# Patient Record
Sex: FEMALE | Race: BLACK OR AFRICAN AMERICAN | NOT HISPANIC OR LATINO | URBAN - METROPOLITAN AREA
[De-identification: names, ages, dates, MRNs, and addresses within clinical notes are randomized per-mention and may not be internally consistent; named-entity substitution may affect disease eponyms.]

---

## 2021-02-09 ENCOUNTER — THERAPY VISIT (OUTPATIENT)
Dept: PHYSICAL THERAPY | Facility: CLINIC | Age: 31
End: 2021-02-09
Payer: COMMERCIAL

## 2021-02-09 DIAGNOSIS — M54.16 LEFT LUMBAR RADICULOPATHY: ICD-10-CM

## 2021-02-09 PROCEDURE — 97110 THERAPEUTIC EXERCISES: CPT | Mod: GP | Performed by: PHYSICAL THERAPIST

## 2021-02-09 PROCEDURE — 97014 ELECTRIC STIMULATION THERAPY: CPT | Mod: GP | Performed by: PHYSICAL THERAPIST

## 2021-02-09 PROCEDURE — 97161 PT EVAL LOW COMPLEX 20 MIN: CPT | Mod: GP | Performed by: PHYSICAL THERAPIST

## 2021-02-09 NOTE — PROGRESS NOTES
[facility] Initial Evaluation  Subjective:  The history is provided by the patient. No  was used.   Patient Health History  Ngozi Chaidez being seen for LBP down Left leg to toes.     Problem began: 10/1/2020.   Problem occurred: Patient slipped down her steps and landed on her butt, but her back hit the edge of the step first.  Current pain didn't present until 2-3 wks later.   Pain is reported as 8/10 on pain scale.  General health as reported by patient is fair.  Pertinent medical history includes: overweight and high blood pressure.   Red flags:  Pain at rest/night.         Current medications:  High blood pressure medication.    Occupation: Stay at home mom.   Primary job tasks include:  Lifting/carrying.                  Therapist Generated HPI Evaluation         Type of problem:  Lumbar.    This is a new condition.  Condition occurred with:  A fall/slip.  Where condition occurred: at home.  Patient reports pain:  Lower lumbar spine.  Pain is described as cramping and aching and is constant.  Pain radiates to:  Gluteals left, knee left, lower leg left and foot left. Pain is the same all the time.  Since onset symptoms are gradually worsening.  Associated symptoms:  Tingling and numbness. Symptoms are exacerbated by standing (lying on L side, placing weight on L leg, prolonged sitting, prolonged standing, walking)  and relieved by nothing.  Special tests included:  MRI (patient said a pinched nerve in her back).    Restrictions due to condition include:  Working in normal job without restrictions.  Barriers include:  None as reported by patient.                        Objective:  Standing Alignment:        Lumbar:  Anterior pelvic tilt and sway back            Gait:    Gait Type:  Antalgic   Assistive Devices:  None  Deviations:  Knee:  Knee extension decr LAnkle:  Push off decr L and heel strike decr LGeneral Deviations:  Julia decr, stance time decr and stride  length decr               Lumbar/SI Evaluation  ROM:    AROM Lumbar:   Flexion:          Top of knee++  Ext:                    33%++   Side Bend:        Left:     Right:   Rotation:           Left:     Right:   Side Glide:        Left:  WNL    Right:  WNL          Lumbar Myotomes:  Lumbar myotomes: strength limited by pain.  Unable to walk on toes or heels, but due to unable to place full weight on L LE.            Lumbar DTR's:    L4 (Quad):  Left:  2   Right:  2  S1 (Achilles):  Left:  1   Right:  2      Neural Tension/Mobility:    Left side:  Slump positive.     Right side:   Slump  negative.   Lumbar Palpation:  normal                                                         General     ROS    Assessment/Plan:    Patient is a 30 year old female with lumbar complaints.    Patient has the following significant findings with corresponding treatment plan.                Diagnosis 1:  L lumbar radiculopathy with likely L5-S1 disc bulge  Pain -  hot/cold therapy, electric stimulation, manual therapy, self management, education, directional preference exercise and home program  Decreased ROM/flexibility - manual therapy, therapeutic exercise and home program  Decreased strength - therapeutic exercise, therapeutic activities and home program  Impaired gait - gait training and home program  Impaired muscle performance - neuro re-education and home program  Decreased function - therapeutic activities and home program  Impaired posture - neuro re-education and home program    Therapy Evaluation Codes:   1) History comprised of:   Personal factors that impact the plan of care:      None.    Comorbidity factors that impact the plan of care are:      None.     Medications impacting care: None.  2) Examination of Body Systems comprised of:   Body structures and functions that impact the plan of care:      Lumbar spine.   Activity limitations that impact the plan of care are:      Bending, Cooking, Dressing, Lifting, Sitting,  Standing, Walking and Sleeping.  3) Clinical presentation characteristics are:   Evolving/Changing.  4) Decision-Making    Low complexity using standardized patient assessment instrument and/or measureable assessment of functional outcome.  Cumulative Therapy Evaluation is: Low complexity.    Previous and current functional limitations:  (See Goal Flow Sheet for this information)    Short term and Long term goals: (See Goal Flow Sheet for this information)     Communication ability:  Patient appears to be able to clearly communicate and understand verbal and written communication and follow directions correctly.  Treatment Explanation - The following has been discussed with the patient:   RX ordered/plan of care  Anticipated outcomes  Possible risks and side effects  This patient would benefit from PT intervention to resume normal activities.   Rehab potential is good.    Frequency:  2 X week, once daily  Duration:  for 2 weeks tapering to 1 X a week over 2 weeks  Discharge Plan:  Achieve all LTG.  Independent in home treatment program.  Reach maximal therapeutic benefit.    Please refer to the daily flowsheet for treatment today, total treatment time and time spent performing 1:1 timed codes.

## 2021-02-11 ENCOUNTER — THERAPY VISIT (OUTPATIENT)
Dept: PHYSICAL THERAPY | Facility: CLINIC | Age: 31
End: 2021-02-11
Payer: COMMERCIAL

## 2021-02-11 DIAGNOSIS — M54.16 LEFT LUMBAR RADICULOPATHY: ICD-10-CM

## 2021-02-11 PROCEDURE — 97014 ELECTRIC STIMULATION THERAPY: CPT | Mod: GP | Performed by: PHYSICAL THERAPIST

## 2021-02-11 PROCEDURE — 97112 NEUROMUSCULAR REEDUCATION: CPT | Mod: GP | Performed by: PHYSICAL THERAPIST

## 2021-02-11 PROCEDURE — 97110 THERAPEUTIC EXERCISES: CPT | Mod: GP | Performed by: PHYSICAL THERAPIST

## 2021-02-18 ENCOUNTER — THERAPY VISIT (OUTPATIENT)
Dept: PHYSICAL THERAPY | Facility: CLINIC | Age: 31
End: 2021-02-18
Payer: COMMERCIAL

## 2021-02-18 DIAGNOSIS — M54.16 LEFT LUMBAR RADICULOPATHY: ICD-10-CM

## 2021-02-18 PROCEDURE — 97014 ELECTRIC STIMULATION THERAPY: CPT | Mod: GP | Performed by: PHYSICAL THERAPIST

## 2021-02-18 PROCEDURE — 97110 THERAPEUTIC EXERCISES: CPT | Mod: GP | Performed by: PHYSICAL THERAPIST

## 2021-02-18 PROCEDURE — 97140 MANUAL THERAPY 1/> REGIONS: CPT | Mod: GP | Performed by: PHYSICAL THERAPIST

## 2021-02-23 ENCOUNTER — THERAPY VISIT (OUTPATIENT)
Dept: PHYSICAL THERAPY | Facility: CLINIC | Age: 31
End: 2021-02-23
Payer: COMMERCIAL

## 2021-02-23 DIAGNOSIS — M54.16 LEFT LUMBAR RADICULOPATHY: ICD-10-CM

## 2021-02-23 PROCEDURE — 97110 THERAPEUTIC EXERCISES: CPT | Mod: GP | Performed by: PHYSICAL THERAPIST

## 2021-02-23 PROCEDURE — 97140 MANUAL THERAPY 1/> REGIONS: CPT | Mod: GP | Performed by: PHYSICAL THERAPIST

## 2021-03-04 ENCOUNTER — THERAPY VISIT (OUTPATIENT)
Dept: PHYSICAL THERAPY | Facility: CLINIC | Age: 31
End: 2021-03-04
Payer: COMMERCIAL

## 2021-03-04 DIAGNOSIS — M54.16 LEFT LUMBAR RADICULOPATHY: ICD-10-CM

## 2021-03-04 PROCEDURE — 97140 MANUAL THERAPY 1/> REGIONS: CPT | Mod: GP | Performed by: PHYSICAL THERAPIST

## 2021-03-04 PROCEDURE — 97110 THERAPEUTIC EXERCISES: CPT | Mod: GP | Performed by: PHYSICAL THERAPIST

## 2021-03-04 PROCEDURE — 97014 ELECTRIC STIMULATION THERAPY: CPT | Mod: GP | Performed by: PHYSICAL THERAPIST

## 2021-03-04 NOTE — PROGRESS NOTES
DISCHARGE  REPORT    Progress reporting period is from 2/9/21 to 3/4/21.       SUBJECTIVE  Subjective changes noted by patient: Patient is able to put weight onto L leg, straighten L knee and walk normal now, but still rates pain 6-8/10.  Says she gets muscle spasms lying in bed at night.  Reports she is doing the HEP and they feel good, but haven't seemed to decrease pain after a month.     Current Pain level: 7/10.      Initial Pain level: 8/10.   Changes in function:  Yes (See Goal flowsheet attached for changes in current functional level)  Adverse reaction to treatment or activity: None    OBJECTIVE  Changes noted in objective findings:  Yes,      Normal gait now and able to extend L knee and put full weight on that leg.    Pain worse with extension and slightly better with flexion.    Very week abdominals and has abdominal wall seperation of greater than 2 finger widths.     Lumbar ROM improved and no longer increases pain in all directions: Flex to mid shin(tight), ext 50% limit, B SG WNL.  No motions increased pain today.     B L4 reflex 2/4 and L achilles reflex 1/4 compared to 2/4 on the R.    Positive Slump on the L.     ASSESSMENT/PLAN  Updated problem list and treatment plan: Diagnosis 1:  LBP with L radiculopathy  Pain -  hot/cold therapy, electric stimulation, manual therapy, self management, education and home program  Decreased strength - therapeutic exercise, therapeutic activities and home program  Impaired muscle performance - neuro re-education and home program  Decreased function - therapeutic activities and home program  STG/LTGs have been met or progress has been made towards goals:  Yes (See Goal flow sheet completed today.)  Assessment of Progress: This patients condition has improved, but not as quickly as expected with her pain relief.  Self Management Plans:  Patient has been instructed in a home treatment program.  Patient  has been instructed in self management of symptoms.  I have  re-evaluated this patient and find that the nature, scope, duration and intensity of the therapy is appropriate for the medical condition of the patient.  Ngozi continues to require the following intervention to meet STG and LTG's:  PT as she needs continued strengthening, but return to MD for further evaluation first.    Recommendations:  This patient would benefit from further evaluation and will see MD before returning to PT    This patient would benefit from continued therapy.     Frequency:  2 X a month, once daily  Duration:  for 2 months    However, patient has not returned to therapy in 6 wks , so plan to discharge with independent HEP        Please refer to the daily flowsheet for treatment today, total treatment time and time spent performing 1:1 timed codes.

## 2021-03-04 NOTE — LETTER
[facility]THLETIC [facility]  [address]  [place] [address] [phone]    2021    Re: Ngozi Chaidez   :   1990  MRN:  [id number]   REFERRING PHYSICIAN:   Johnson Hatch    [facility]THLET[facility] PHYSICAL [facility]    Date of Initial Evaluation:  2021  Visits:  Rxs Used: 5  Reason for Referral:  Left lumbar radiculopathy    EVALUATION SUMMARY    PROGRESS  REPORT    Progress reporting period is from 21 to 3/4/21.       SUBJECTIVE  Subjective changes noted by patient: Patient is able to put weight onto L leg, straighten L knee and walk normal now, but still rates pain 6-8/10.  Says she gets muscle spasms lying in bed at night.  Reports she is doing the HEP and they feel good, but haven't seemed to decrease pain after a month.     Current Pain level: 7/10.      Initial Pain level: 8/10.   Changes in function:  Yes (See Goal flowsheet attached for changes in current functional level)  Adverse reaction to treatment or activity: None    OBJECTIVE  Changes noted in objective findings:  Yes,      Normal gait now and able to extend L knee and put full weight on that leg.    Pain worse with extension and slightly better with flexion.    Very week abdominals and has abdominal wall seperation of greater than 2 finger widths.     Lumbar ROM improved and no longer increases pain in all directions: Flex to mid shin(tight), ext 50% limit, B SG WNL.  No motions increased pain today.     B L4 reflex 2/4 and L achilles reflex 1/4 compared to 2/4 on the R.    Positive Slump on the L.     ASSESSMENT/PLAN  Updated problem list and treatment plan: Diagnosis 1:  LBP with L radiculopathy  Pain -  hot/cold therapy, electric stimulation, manual therapy, self management, education and home program  Decreased strength - therapeutic exercise, therapeutic activities and home program  Impaired muscle performance - neuro re-education and home  program  Decreased function - therapeutic activities and home program  STG/LTGs have been met or progress has been made towards goals:  Yes (See Goal flow sheet completed today.)  Assessment of Progress: This patients condition has improved, but not as quickly as expected with her pain relief.  Self Management Plans:  Patient has been instructed in a home treatment program.  Patient  has been instructed in self management of symptoms.  I have re-evaluated this patient and find that the nature, scope, duration and intensity of the therapy is appropriate for the medical condition of the patient.  Ngozi continues to require the following intervention to meet STG and LTG's:  PT as she needs continued strengthening, but return to MD for further evaluation first.    Recommendations:  This patient would benefit from further evaluation and will see MD before returning to PT    This patient would benefit from continued therapy.     Frequency:  2 X a month, once daily  Duration:  for 2 months        Thank you for your referral.    INQUIRIES  Therapist: Mckenna Martinez [facility]   [facility] - [facility] PHYSICAL THERAPY  [address] [place], [place] [address]  [facility] [address]  Phone: [phone]  Fax: [phone]

## 2021-04-15 PROBLEM — M54.16 LEFT LUMBAR RADICULOPATHY: Status: RESOLVED | Noted: 2021-02-09 | Resolved: 2021-04-15

## 2022-02-14 ENCOUNTER — APPOINTMENT (OUTPATIENT)
Dept: GENERAL RADIOLOGY | Facility: CLINIC | Age: 32
End: 2022-02-14
Attending: EMERGENCY MEDICINE
Payer: COMMERCIAL

## 2022-02-14 ENCOUNTER — HOSPITAL ENCOUNTER (EMERGENCY)
Facility: CLINIC | Age: 32
Discharge: HOME | End: 2022-02-14
Attending: EMERGENCY MEDICINE | Admitting: EMERGENCY MEDICINE
Payer: COMMERCIAL

## 2022-02-14 VITALS
HEART RATE: 74 BPM | OXYGEN SATURATION: 100 % | TEMPERATURE: 98 F | RESPIRATION RATE: 16 BRPM | DIASTOLIC BLOOD PRESSURE: 87 MMHG | SYSTOLIC BLOOD PRESSURE: 138 MMHG

## 2022-02-14 DIAGNOSIS — M25.461 EFFUSION OF RIGHT KNEE: ICD-10-CM

## 2022-02-14 DIAGNOSIS — M25.512 ACUTE PAIN OF LEFT SHOULDER: ICD-10-CM
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
## 2022-02-14 PROCEDURE — 73030 X-RAY EXAM OF SHOULDER: CPT | Mod: LT

## 2022-02-14 PROCEDURE — 73562 X-RAY EXAM OF KNEE 3: CPT | Mod: RT

## 2022-02-14 PROCEDURE — 99284 EMERGENCY DEPT VISIT MOD MDM: CPT | Performed by: EMERGENCY MEDICINE

## 2022-02-14 RX ORDER — CYCLOBENZAPRINE HCL 10 MG
10 TABLET ORAL 3 TIMES DAILY PRN
Qty: 20 TABLET | Refills: 0 | Status: SHIPPED | OUTPATIENT
Start: 2022-02-14 | End: 2022-02-20

## 2022-02-14 RX ORDER — IBUPROFEN 600 MG/1
600 TABLET, FILM COATED ORAL EVERY 6 HOURS PRN
Qty: 30 TABLET | Refills: 0 | Status: SHIPPED | OUTPATIENT
Start: 2022-02-14

## 2022-02-15 NOTE — ED PROVIDER NOTES
History     Chief Complaint   Patient presents with     Shoulder Injury     L shoulder after fall     Knee Pain     R knee after fall     HPI  Ngozi Chaidez is a 31 year old female who presents to the emergency department with a chief complaint of fall.  The patient reports that she looked and fell on the ice yesterday.  She now complains of a swollen R knee and painful L shoulder.  Pain worsens with ROM. She has been able to ambulate. No other associated injuries.  The patient is not on any blood thinners.  She tried ibuprofen and Tylenol at home with incomplete relief.    I have reviewed the Medications, Allergies, Past Medical and Surgical History, and Social History in the Epic system.    No past medical history on file.  No past surgical history on file.  No current facility-administered medications for this encounter.     No current outpatient medications on file.     Not on File  Past medical history, past surgical history, medications, and allergies were reviewed with the patient. Additional pertinent items: None    Social History     Socioeconomic History     Marital status: Single     Spouse name: Not on file     Number of children: Not on file     Years of education: Not on file     Highest education level: Not on file   Occupational History     Not on file   Tobacco Use     Smoking status: Not on file     Smokeless tobacco: Not on file   Substance and Sexual Activity     Alcohol use: Not on file     Drug use: Not on file     Sexual activity: Not on file   Other Topics Concern     Not on file   Social History Narrative     Not on file     Social Determinants of Health     Financial Resource Strain: Not on file   Food Insecurity: Not on file   Transportation Needs: Not on file   Physical Activity: Not on file   Stress: Not on file   Social Connections: Not on file   Intimate Partner Violence: Not on file   Housing Stability: Not on file     Social history was reviewed with the patient. Additional  pertinent items: None    Review of Systems  General: No fevers or chills  Skin: No rash or diaphoresis  Eyes: No eye redness or discharge  Ears/Nose/Throat: No rhinorrhea or nasal congestion  Respiratory: No cough or SOB  Cardiovascular: No chest pain or palpitations  Gastrointestinal: No nausea, vomiting, or diarrhea  Genitourinary: No urinary frequency, hematuria, or dysuria  Musculoskeletal: See HPI  Neurologic: No numbness or weakness  Hematologic/Lymphatic/Immunologic: No leg swelling, no easy bruising/bleeding  Endocrine: No polyuria/polydypsia    A complete review of systems was performed with pertinent positives and negatives noted in the HPI, and all other systems negative.    Physical Exam   BP: 123/75  Pulse: 118  Temp: 99  F (37.2  C)  Resp: 16  SpO2: 96 %      General: Well nourished, well developed, NAD  HEENT: EOMI, anicteric. NCAT, MMM  Neck: no jugular venous distension, supple, nl ROM  Cardiac: Tachycardic rate, intact peripheral pulses  Pulm: Airway patent, nonlabored breathing  Skin: Warm and dry to the touch.  No rash  Extremities: No LE edema, no cyanosis, w/w/p, swelling to right knee, pain with range of motion, but range of motion otherwise intact and knee joint is stable on my exam, distally neurovascularly intact, tenderness to palpation over posterior left shoulder, pain with range of motion, but range of motion otherwise intact.  Rotator cuff is intact, extremity is distally neurovascularly intact  Neuro: A&Ox3, no gross focal deficits    ED Course        Procedures                          Labs Ordered and Resulted from Time of ED Arrival to Time of ED Departure - No data to display         Results for orders placed or performed during the hospital encounter of 02/14/22 (from the past 24 hour(s))   XR Knee Right 3 Views    Narrative    EXAM: XR KNEE RIGHT 3 VIEWS  LOCATION: [facility]  DATE/TIME: 2/14/2022 6:08 PM    INDICATION: fall, pain,  swelling  COMPARISON: None.      Impression    IMPRESSION: Probable right knee joint effusion. Joint spaces are maintained. No fracture.   XR Shoulder Left G/E 3 Views    Narrative    EXAM: XR SHOULDER LEFT G/E 3 VIEWS  LOCATION: [facility]  DATE/TIME: 2/14/2022 6:08 PM    INDICATION: falll, pain swelling  COMPARISON: None.      Impression    IMPRESSION: Normal joint spaces and alignment. No fracture or dislocation.        Labs, vital signs, and imaging studies were reviewed by me.    Medications - No data to display    Assessments & Plan (with Medical Decision Making)   Ngozi Chaidez is a 31 year old female who presents to the emergency department after slip and fall on the ice complaining of left shoulder and right knee pain.  X-rays ordered to further evaluate the patient for underlying fracture or dislocation.  Differential diagnosis also includes contusion, sprain, joint effusion.    X-ray of the right knee reveals probable right knee joint effusion, no acute fracture.    X-ray of the left shoulder shows no fracture or dislocation.    Patient provided with an Ace wrap for her right knee.    I have reviewed the nursing notes.    I have reviewed the findings, diagnosis, plan and need for follow up with the patient.    Patient to be discharged home. Advised to follow up with PCP within 1 week.  She is advised to discuss possibility of outpatient MRI, particularly of the right knee if symptoms do not resolve after several weeks.  To return to ER immediately with any new/worsening symptoms. Plan of care discussed with patient who expresses understanding and agrees with plan of care.  Prescriptions for medications for symptomatic relief at home were provided to the patient.    New Prescriptions    CYCLOBENZAPRINE (FLEXERIL) 10 MG TABLET    Take 1 tablet (10 mg) by mouth 3 times daily as needed    IBUPROFEN (ADVIL/MOTRIN) 600 MG TABLET    Take 1 tablet (600 mg) by  mouth every 6 hours as needed       Final diagnoses:   Acute pain of left shoulder   Effusion of right knee   Fall due to slipping on ice or snow, initial encounter     Rae Gonzalez MD  2/14/2022   [facility] EMERGENCY DEPARTMENT     Rae Gonzalez MD  02/14/22 1954

## 2022-02-15 NOTE — DISCHARGE INSTRUCTIONS
TODAY'S VISIT:  You were seen today for fall  -   - If you had any labs or imaging/radiology tests performed today, you should also discuss these tests with your usual provider.     FOLLOW-UP:  Please make an appointment to follow up with:  - Your Primary Care Provider. If you do not have a PCP, please call the Primary Care Center (phone: [phone] for an appointment    - Have your provider review the results from today's visit with you again to make sure no further follow-up or additional testing is needed based on those results.     PRESCRIPTIONS / MEDICATIONS:  - You may take Ibuprofen and/or Tylenol as needed for pain. You can take 600 mg of Ibuprofen every 6 hours and 1 g Tylenol every 6 hours. It may help to alternate these, so you take something every 3 hours.     OTHER INSTRUCTIONS:  -  Applying ice to the affected area several times a day over the next 1-2 days may also help with pain and swelling    RETURN TO THE EMERGENCY DEPARTMENT  Return to the Emergency Department at any time for any new or worsening symptoms or any concerns.

## 2022-02-15 NOTE — ED PROVIDER NOTES
[facility] EMERGENCY DEPARTMENT ([facility])    2/14/22      History     Chief Complaint   Patient presents with     Shoulder Injury     L shoulder after fall     Knee Pain     R knee after fall     HPI  Ngozi Chaidez is a 31 year old otherwise healthy female who presents to the ED for an evaluation of right knee and left shoulder pain. Patient reports falling on the ice yesterday.***    Past Medical History  No past medical history on file.  No past surgical history on file.  No current outpatient medications on file.    Not on File  Family History  No family history on file.  Social History   Social History     Tobacco Use     Smoking status: Not on file     Smokeless tobacco: Not on file   Substance Use Topics     Alcohol use: Not on file     Drug use: Not on file      Past medical history, past surgical history, medications, allergies, family history, and social history were reviewed with the patient. No additional pertinent items.       Review of Systems  A complete review of systems was performed with pertinent positives and negatives noted in the HPI, and all other systems negative.    Physical Exam   BP: 123/75  Pulse: 118  Temp: 99  F (37.2  C)  Resp: 16  SpO2: 96 %  Physical Exam  ***  ED Course      Procedures       {ED Course Selections:684059}              No results found for any visits on 02/14/22.  Medications - No data to display     Assessments & Plan (with Medical Decision Making)   ***    I have reviewed the nursing notes. I have reviewed the findings, diagnosis, plan and need for follow up with the patient.    New Prescriptions    No medications on file       Final diagnoses:   None       --  ***  [facility] EMERGENCY DEPARTMENT  2/14/2022

## 2022-09-26 ENCOUNTER — TRANSCRIBE ORDERS (OUTPATIENT)
Dept: OTHER | Age: 32
End: 2022-09-26

## 2022-09-26 DIAGNOSIS — M54.32 SCIATICA OF LEFT SIDE: Primary | ICD-10-CM

## 2022-10-27 ENCOUNTER — THERAPY VISIT (OUTPATIENT)
Dept: PHYSICAL THERAPY | Facility: CLINIC | Age: 32
End: 2022-10-27
Payer: COMMERCIAL

## 2022-10-27 DIAGNOSIS — G89.29 CHRONIC BILATERAL LOW BACK PAIN WITH LEFT-SIDED SCIATICA: ICD-10-CM

## 2022-10-27 DIAGNOSIS — M54.42 CHRONIC BILATERAL LOW BACK PAIN WITH LEFT-SIDED SCIATICA: ICD-10-CM

## 2022-10-27 DIAGNOSIS — M54.32 SCIATICA OF LEFT SIDE: ICD-10-CM

## 2022-10-27 PROCEDURE — 97110 THERAPEUTIC EXERCISES: CPT | Mod: GP | Performed by: PHYSICAL THERAPIST

## 2022-10-27 PROCEDURE — 97161 PT EVAL LOW COMPLEX 20 MIN: CPT | Mod: GP | Performed by: PHYSICAL THERAPIST

## 2022-10-27 ASSESSMENT — ACTIVITIES OF DAILY LIVING (ADL)
WALKING_15_MINUTES_OR_GREATER: MODERATE DIFFICULTY
ROLLING_OVER_IN_BED: SLIGHT DIFFICULTY
WALKING_APPROXIMATELY_10_MINUTES: NO DIFFICULTY AT ALL
GETTING_INTO_AND_OUT_OF_A_BATHTUB: NO DIFFICULTY AT ALL
LIGHT_TO_MODERATE_WORK: SLIGHT DIFFICULTY
SITTING_FOR_15_MINUTES: SLIGHT DIFFICULTY
HOS_ADL_SCORE(%): 78.33
GOING_UP_1_FLIGHT_OF_STAIRS: EXTREME DIFFICULTY
HOS_ADL_COUNT: 15
GOING_DOWN_1_FLIGHT_OF_STAIRS: NO DIFFICULTY AT ALL
WALKING_UP_STEEP_HILLS: MODERATE DIFFICULTY
STANDING_FOR_15_MINUTES: MODERATE DIFFICULTY
WALKING_INITIALLY: NO DIFFICULTY AT ALL
HOS_ADL_ITEM_SCORE_TOTAL: 47
STEPPING_UP_AND_DOWN_CURBS: NO DIFFICULTY AT ALL
WALKING_DOWN_STEEP_HILLS: NO DIFFICULTY AT ALL
PUTTING_ON_SOCKS_AND_SHOES: NO DIFFICULTY AT ALL
GETTING_INTO_AND_OUT_OF_AN_AVERAGE_CAR: NO DIFFICULTY AT ALL
HEAVY_WORK: MODERATE DIFFICULTY
TWISTING/PIVOTING_ON_INVOLVED_LEG: NO DIFFICULTY AT ALL
HOS_ADL_HIGHEST_POTENTIAL_SCORE: 60

## 2022-10-27 NOTE — PROGRESS NOTES
[facility] Initial Evaluation     Present: The history is provided by the patient. No  was used.    Subjective:  Ngozi Chaidez is a 31 year old female with complaints of lumbar radiculopathy - reports pain across her low back, down her left leg, and numbness in lateral 3 toes on the left. Pt reports that it started about a 1.5 years ago with no known mechanism of injury.     Symptoms commenced as a result of: insidious onset. Location of symptoms: across low back, down leg into foot. Pain level on number scale: 6/10. Quality of pain: achy, shooting, changing sensation. Associated symptoms: radiating pain into foot. Pain frequency (constant/intermittent): constant. Symptoms are exacerbated by: walking - pain in left hip. Symptoms are relieved by: unknown. Progression of symptoms since onset: worse. Imaging: see epic. Previous treatment/response: PT previously, was not helpful.      General health as reported by patient is fair. Pertinent medical history includes: See Epic. Medical allergies: see Epic. Other pertinent surgeries: see Epic. Current medications: See Epic.     Occupation: none.     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    Red flags:  None reported by patient.    Objective  Observation:  Posture: rounded/protracted shoulder    Gait: WFL      Lumbar Movement Response   Flexion 80%, reaches to ankles, pull in low back and legs   Extension 70%   Side bending/glide L 60%, hinges in mid/upper lumbar   Side bending/glide R 60%, hinges in mid/upper lumbar   Rotation L 80%   Rotation R 80%   Quadrants (if applicable)      Neurological:    Myotomes L R   L1-2 (hip flexion) 5/5 5/5   L3 (knee extension) 5/5 5/5   L4 (ankle DF) 5/5 5/5   L5 (g. toe ext) 5/5 5/5   S1 (ankle PF or knee flex) 5/5 5/5     Dural Signs L R   Slump + -   SLR + -     *improved slump test with  cervical/thoracic extension    Palpation: tenderness with palpation of lower lumbar paraspinals    Prone Assessment: hypomobility through thoracic spine, improved with manual tx    Key Findings  Patient presents with bilateral low back pain and radicular symptoms into left leg/lateraal foot/toes in S1 distribution; hypomobility in lower lumbar, decreased lumbar AROM; positive neurodynamic testing, worse on left side      Assessment/Plan:    Patient is a 31 year old female with lumbar complaints.    Patient has the following significant findings with corresponding treatment plan.                Diagnosis 1:  Lumbar radiculopathy  Pain -  self management, education and home program  Decreased ROM/flexibility - manual therapy, therapeutic exercise, therapeutic activity and home program    Therapy Evaluation Codes:     Cumulative Therapy Evaluation is: Low complexity.    Previous and current functional limitations:  (See Goal Flow Sheet for this information)    Short term and Long term goals: (See Goal Flow Sheet for this information)     Communication ability:  Patient appears to be able to clearly communicate and understand verbal and written communication and follow directions correctly.  Treatment Explanation - The following has been discussed with the patient:   RX ordered/plan of care  Anticipated outcomes  Possible risks and side effects  This patient would benefit from PT intervention to resume normal activities.   Rehab potential is good.    Frequency:  1 X week, once daily  Duration:  for 8 weeks  Discharge Plan:  Achieve all LTG.  Independent in home treatment program.  Reach maximal therapeutic benefit.    Please refer to the daily flowsheet for treatment today, total treatment time and time spent performing 1:1 timed codes.     Please Contact me with any questions or concerns. Thank you for your time and entrusting me with your care.     Anthony Schwartz, PT, DPT, OCS, CSMT  Physical Therapist  [facility]  [facility] - town  [phone]

## 2022-10-27 NOTE — PROGRESS NOTES
[facility]    OUTPATIENT Physical Therapy ORTHOPEDIC EVALUATION  PLAN OF TREATMENT FOR OUTPATIENT REHABILITATION  (COMPLETE FOR INITIAL CLAIMS ONLY)  Patient's Last Name, First Name, M.I.  YOB: 1990  Ngozi Chaidez    Provider s Name:  [facility]   Medical Record No.  [id number]   Start of Care Date:  10/27/22   Onset Date:   09/26/22 (PT referral date)   Treatment Diagnosis:  L sciatica Medical Diagnosis:     Sciatica of left side  Chronic bilateral low back pain with left-sided sciatica       Goals:     10/27/22 0500   Body Part   Goals listed below are for L sciatica   Goal #1   Goal #1 standing   Previous Functional Level No restrictions   Current Functional Level Minutes patient can stand   Performance level 15 minutes, 6/10 pain   STG Target Performance Minutes patient will be able to stand   Performance level 15 minutes, <3/10 pain   Rationale for housekeeping tasks such as vacuuming, bed making, mowing, gardening;for meal preparation   Due date 11/17/22   LTG Target Performance Minutes patient will be able to stand   Performance Level 30 min, no pain   Rationale for housekeeping tasks such as vacuuming, bed making, mowing;for personal hygiene;for meal preparation   Due date 12/22/22       Therapy Frequency:  1x/week  Predicted Duration of Therapy Intervention:  12 weeks    Anthony Schwartz, PT                 I CERTIFY THE NEED FOR THESE SERVICES FURNISHED UNDER        THIS PLAN OF TREATMENT AND WHILE UNDER MY CARE     (Physician attestation of this document indicates review and certification of the therapy plan).                     Certification Date From:  10/27/22   Certification Date To:  01/24/23    Referring Provider:  Justo Armstrong    Initial Assessment        See Epic Evaluation SOC Date: 10/27/22

## 2022-11-11 ENCOUNTER — THERAPY VISIT (OUTPATIENT)
Dept: PHYSICAL THERAPY | Facility: CLINIC | Age: 32
End: 2022-11-11
Payer: COMMERCIAL

## 2022-11-11 DIAGNOSIS — M54.16 LUMBAR RADICULOPATHY: ICD-10-CM

## 2022-11-11 DIAGNOSIS — G89.29 CHRONIC BILATERAL LOW BACK PAIN WITH LEFT-SIDED SCIATICA: Primary | ICD-10-CM

## 2022-11-11 DIAGNOSIS — M54.42 CHRONIC BILATERAL LOW BACK PAIN WITH LEFT-SIDED SCIATICA: Primary | ICD-10-CM

## 2022-11-11 PROCEDURE — 97012 MECHANICAL TRACTION THERAPY: CPT | Mod: GP | Performed by: PHYSICAL THERAPIST

## 2022-11-11 PROCEDURE — 97110 THERAPEUTIC EXERCISES: CPT | Mod: GP | Performed by: PHYSICAL THERAPIST

## 2022-11-23 ENCOUNTER — THERAPY VISIT (OUTPATIENT)
Dept: PHYSICAL THERAPY | Facility: CLINIC | Age: 32
End: 2022-11-23
Payer: COMMERCIAL

## 2022-11-23 DIAGNOSIS — M54.42 CHRONIC BILATERAL LOW BACK PAIN WITH LEFT-SIDED SCIATICA: Primary | ICD-10-CM

## 2022-11-23 DIAGNOSIS — M54.16 LUMBAR RADICULOPATHY: ICD-10-CM

## 2022-11-23 DIAGNOSIS — G89.29 CHRONIC BILATERAL LOW BACK PAIN WITH LEFT-SIDED SCIATICA: Primary | ICD-10-CM

## 2022-11-23 PROCEDURE — 97110 THERAPEUTIC EXERCISES: CPT | Mod: GP | Performed by: PHYSICAL THERAPIST

## 2022-11-23 PROCEDURE — 97012 MECHANICAL TRACTION THERAPY: CPT | Mod: GP | Performed by: PHYSICAL THERAPIST

## 2023-02-09 NOTE — PROGRESS NOTES
DISCHARGE SUMMARY    Ngozi Chaidez was seen 3 times for evaluation and treatment.  Patient did not return for further treatment and current status is unknown.      Please see goal flow sheet from episode noted date below and initial evaluation for further information.  Patient is discharged from therapy and therapy episode is resolved as of 02/09/23.      Linked Episodes   Type: Episode: Status: Noted: Resolved: Last update: Updated by:   PHYSICAL THERAPY L sciatica 10/27/22 Active 10/27/2022  11/23/2022 11:55 AM Anthony Schwartz, PT      Comments:

## 2023-08-09 ENCOUNTER — HOSPITAL ENCOUNTER (EMERGENCY)
Facility: CLINIC | Age: 33
Discharge: HOME | End: 2023-08-09
Attending: EMERGENCY MEDICINE | Admitting: EMERGENCY MEDICINE
Payer: COMMERCIAL

## 2023-08-09 ENCOUNTER — APPOINTMENT (OUTPATIENT)
Dept: GENERAL RADIOLOGY | Facility: CLINIC | Age: 33
End: 2023-08-09
Attending: EMERGENCY MEDICINE
Payer: COMMERCIAL

## 2023-08-09 VITALS
TEMPERATURE: 98.7 F | OXYGEN SATURATION: 99 % | WEIGHT: 165.7 LBS | RESPIRATION RATE: 16 BRPM | SYSTOLIC BLOOD PRESSURE: 132 MMHG | HEART RATE: 81 BPM | DIASTOLIC BLOOD PRESSURE: 84 MMHG

## 2023-08-09 DIAGNOSIS — R05.1 ACUTE COUGH: ICD-10-CM

## 2023-08-09 LAB
ALBUMIN SERPL BCG-MCNC: 4.7 G/DL (ref 3.5–5.2)
ALP SERPL-CCNC: 95 U/L (ref 35–104)
ALT SERPL W P-5'-P-CCNC: 18 U/L (ref 0–50)
ANION GAP SERPL CALCULATED.3IONS-SCNC: 11 MMOL/L (ref 7–15)
AST SERPL W P-5'-P-CCNC: 21 U/L (ref 0–45)
BASOPHILS # BLD AUTO: 0.1 10E3/UL (ref 0–0.2)
BASOPHILS NFR BLD AUTO: 1 %
BILIRUB SERPL-MCNC: 0.4 MG/DL
BUN SERPL-MCNC: 10.9 MG/DL (ref 6–20)
CALCIUM SERPL-MCNC: 9.4 MG/DL (ref 8.6–10)
CHLORIDE SERPL-SCNC: 99 MMOL/L (ref 98–107)
CREAT SERPL-MCNC: 0.86 MG/DL (ref 0.51–0.95)
D DIMER PPP FEU-MCNC: <0.27 UG/ML FEU (ref 0–0.5)
DEPRECATED HCO3 PLAS-SCNC: 27 MMOL/L (ref 22–29)
EOSINOPHIL # BLD AUTO: 0.3 10E3/UL (ref 0–0.7)
EOSINOPHIL NFR BLD AUTO: 3 %
ERYTHROCYTE [DISTWIDTH] IN BLOOD BY AUTOMATED COUNT: 12.3 % (ref 10–15)
GFR SERPL CREATININE-BSD FRML MDRD: 90 ML/MIN/1.73M2
GLUCOSE SERPL-MCNC: 100 MG/DL (ref 70–99)
HCG SERPL QL: NEGATIVE
HCT VFR BLD AUTO: 43.9 % (ref 35–47)
HGB BLD-MCNC: 15 G/DL (ref 11.7–15.7)
IMM GRANULOCYTES # BLD: 0 10E3/UL
IMM GRANULOCYTES NFR BLD: 0 %
LYMPHOCYTES # BLD AUTO: 1.8 10E3/UL (ref 0.8–5.3)
LYMPHOCYTES NFR BLD AUTO: 24 %
MCH RBC QN AUTO: 29.9 PG (ref 26.5–33)
MCHC RBC AUTO-ENTMCNC: 34.2 G/DL (ref 31.5–36.5)
MCV RBC AUTO: 88 FL (ref 78–100)
MONOCYTES # BLD AUTO: 0.5 10E3/UL (ref 0–1.3)
MONOCYTES NFR BLD AUTO: 7 %
NEUTROPHILS # BLD AUTO: 4.7 10E3/UL (ref 1.6–8.3)
NEUTROPHILS NFR BLD AUTO: 65 %
NRBC # BLD AUTO: 0 10E3/UL
NRBC BLD AUTO-RTO: 0 /100
PLATELET # BLD AUTO: 337 10E3/UL (ref 150–450)
POTASSIUM SERPL-SCNC: 3.5 MMOL/L (ref 3.4–5.3)
PROT SERPL-MCNC: 8.2 G/DL (ref 6.4–8.3)
RBC # BLD AUTO: 5.01 10E6/UL (ref 3.8–5.2)
SARS-COV-2 RNA RESP QL NAA+PROBE: NEGATIVE
SODIUM SERPL-SCNC: 137 MMOL/L (ref 136–145)
WBC # BLD AUTO: 7.4 10E3/UL (ref 4–11)

## 2023-08-09 PROCEDURE — 99284 EMERGENCY DEPT VISIT MOD MDM: CPT | Mod: 25 | Performed by: EMERGENCY MEDICINE

## 2023-08-09 PROCEDURE — 71046 X-RAY EXAM CHEST 2 VIEWS: CPT

## 2023-08-09 PROCEDURE — 85379 FIBRIN DEGRADATION QUANT: CPT | Performed by: EMERGENCY MEDICINE

## 2023-08-09 PROCEDURE — 99284 EMERGENCY DEPT VISIT MOD MDM: CPT | Performed by: EMERGENCY MEDICINE

## 2023-08-09 PROCEDURE — 84703 CHORIONIC GONADOTROPIN ASSAY: CPT | Performed by: EMERGENCY MEDICINE

## 2023-08-09 PROCEDURE — 250N000013 HC RX MED GY IP 250 OP 250 PS 637: Performed by: EMERGENCY MEDICINE

## 2023-08-09 PROCEDURE — 80053 COMPREHEN METABOLIC PANEL: CPT | Performed by: EMERGENCY MEDICINE

## 2023-08-09 PROCEDURE — 36415 COLL VENOUS BLD VENIPUNCTURE: CPT | Performed by: EMERGENCY MEDICINE

## 2023-08-09 PROCEDURE — 87635 SARS-COV-2 COVID-19 AMP PRB: CPT | Performed by: EMERGENCY MEDICINE

## 2023-08-09 PROCEDURE — 85025 COMPLETE CBC W/AUTO DIFF WBC: CPT | Performed by: EMERGENCY MEDICINE

## 2023-08-09 RX ORDER — BENZONATATE 100 MG/1
100 CAPSULE ORAL 3 TIMES DAILY PRN
Status: DISCONTINUED | OUTPATIENT
Start: 2023-08-09 | End: 2023-08-09 | Stop reason: HOSPADM

## 2023-08-09 RX ORDER — CYCLOBENZAPRINE HCL 10 MG
10 TABLET ORAL ONCE
Status: COMPLETED | OUTPATIENT
Start: 2023-08-09 | End: 2023-08-09

## 2023-08-09 RX ORDER — CYCLOBENZAPRINE HCL 10 MG
10 TABLET ORAL 3 TIMES DAILY PRN
Qty: 15 TABLET | Refills: 0 | Status: SHIPPED | OUTPATIENT
Start: 2023-08-09 | End: 2023-08-14

## 2023-08-09 RX ORDER — BENZONATATE 100 MG/1
100 CAPSULE ORAL 3 TIMES DAILY PRN
Qty: 21 CAPSULE | Refills: 0 | Status: SHIPPED | OUTPATIENT
Start: 2023-08-09 | End: 2023-08-16

## 2023-08-09 RX ADMIN — CYCLOBENZAPRINE 10 MG: 10 TABLET, FILM COATED ORAL at 16:47

## 2023-08-09 RX ADMIN — BENZONATATE 100 MG: 100 CAPSULE ORAL at 17:24

## 2023-08-09 ASSESSMENT — ACTIVITIES OF DAILY LIVING (ADL)
ADLS_ACUITY_SCORE: 35
ADLS_ACUITY_SCORE: 33

## 2023-08-09 NOTE — ED PROVIDER NOTES
ED Provider Note  [facility]      History     Chief Complaint   Patient presents with    Cough     Pt has juanita having a persistent cough for the past week with pain while inhaling.      HPI  Ngozi Chaidez is a 32 year old female who presents to the Emergency Department seeking evaluation of a cough and pain to her right chest when inhaling.  Symptoms have been ongoing for the past several days.  She reports that she was seen at  yesterday for this, and was subsequently diagnosed with bronchitis. She now reports that her symptoms are worsening since being diagnosed yesterday.  She notes posttussive emesis with frequent coughing fits.  She states that her daughter has had similar symptoms but improved quickly.  Patient did not have any labs or imaging done when being seen, was prescribed azithromycin and took the first dose yesterday but did not take a dose today.  She also notes feeling chilled.  No other symptoms noted.    Past Medical History  No past medical history on file.  No past surgical history on file.  ibuprofen (ADVIL/MOTRIN) 600 MG tablet      No Known Allergies  Family History  No family history on file.  Social History       Past medical history, past surgical history, medications, allergies, family history, and social history were reviewed with the patient. No additional pertinent items.      A medically appropriate review of systems was performed with pertinent positives and negatives noted in the HPI, and all other systems negative.    Physical Exam   BP: 122/82  Pulse: 103  Temp: 98.7  F (37.1  C)  Resp: 16  Weight: 75.2 kg (165 lb 11.2 oz)  SpO2: 98 %  Physical Exam  Vitals and nursing note reviewed.   Constitutional:       General: She is not in acute distress.     Appearance: She is well-developed. She is not diaphoretic.   HENT:      Head: Normocephalic and atraumatic.      Mouth/Throat:      Pharynx: No oropharyngeal exudate.   Eyes:      General: No scleral  icterus.        Right eye: No discharge.         Left eye: No discharge.      Pupils: Pupils are equal, round, and reactive to light.   Cardiovascular:      Rate and Rhythm: Normal rate and regular rhythm.      Heart sounds: Normal heart sounds. No murmur heard.     No friction rub. No gallop.   Pulmonary:      Effort: Pulmonary effort is normal. No respiratory distress.      Breath sounds: Normal breath sounds. No wheezing.   Chest:      Chest wall: No tenderness.   Abdominal:      General: Bowel sounds are normal. There is no distension.      Palpations: Abdomen is soft.      Tenderness: There is no abdominal tenderness.   Musculoskeletal:         General: No tenderness or deformity. Normal range of motion.      Cervical back: Normal range of motion and neck supple.   Skin:     General: Skin is warm and dry.      Coloration: Skin is not pale.      Findings: No erythema or rash.   Neurological:      Mental Status: She is alert and oriented to person, place, and time.      Cranial Nerves: No cranial nerve deficit.           ED Course, Procedures, & Data      Procedures                      No results found for any visits on 08/09/23.  Medications - No data to display  Labs Ordered and Resulted from Time of ED Arrival to Time of ED Departure - No data to display  No orders to display              Assessment & Plan    This is a 32-year-old female who presents with several days of cough and chest pain with deep breathing.  She also notes posttussive emesis.  She has sick contact, her daughter with similar symptoms.  She was seen at urgent care yesterday and prescribed azithromycin.  She notes pain in her upper chest with deep breathing.  Exam demonstrates no acute abnormalities.  Lab work shows no acute abnormalities.  COVID test is negative.  D-dimer was obtained due to pleuritic pain which shows no acute abnormalities.  I discussed all results with patient.  We will start patient on a course of benzonatate.  As she  does note pain in her upper chest with breathing that she states feels like tight muscles we will also provide prescription for cyclobenzaprine.  We will discharge with return precautions.    I have reviewed the nursing notes. I have reviewed the findings, diagnosis, plan and need for follow up with the patient.    New Prescriptions    No medications on file       Final diagnoses:   None   IZak, am serving as a trained medical scribe to document services personally performed by Cory Duarte DO, based on the provider's statements to me.     ICory DO, was physically present and have reviewed and verified the accuracy of this note documented by Zak Wheatley.      Cory Duarte DO  [facility] EMERGENCY DEPARTMENT  8/9/2023     Cory Duarte DO  08/09/23 [id number]

## 2023-08-09 NOTE — LETTER
August 9, 2023      To Whom It May Concern:      Ngozi Chaidez was seen in our Emergency Department today, 08/09/23.  I expect her condition to improve over the next 3 days.  She may return to work when improved.    Sincerely,        Cory Duarte, DO

## 2023-08-09 NOTE — ED TRIAGE NOTES
Per pt she has been having a persistent cough with pain to right chest when inhaling. Was seen at urgent care yesterday and diagnosed with bronchitis. Feels symptoms are worsening.